# Patient Record
Sex: FEMALE | Race: BLACK OR AFRICAN AMERICAN | URBAN - METROPOLITAN AREA
[De-identification: names, ages, dates, MRNs, and addresses within clinical notes are randomized per-mention and may not be internally consistent; named-entity substitution may affect disease eponyms.]

---

## 2024-08-30 ENCOUNTER — EMERGENCY (EMERGENCY)
Facility: HOSPITAL | Age: 44
LOS: 1 days | End: 2024-08-30

## 2024-08-30 VITALS
OXYGEN SATURATION: 98 % | WEIGHT: 279.99 LBS | TEMPERATURE: 98 F | DIASTOLIC BLOOD PRESSURE: 84 MMHG | SYSTOLIC BLOOD PRESSURE: 122 MMHG | HEART RATE: 60 BPM | HEIGHT: 63 IN | RESPIRATION RATE: 16 BRPM

## 2024-08-30 PROCEDURE — L9981: CPT

## 2024-08-30 NOTE — ED ADULT TRIAGE NOTE - PAIN: PRESENCE, MLM
TRANSESOPHAGEAL ECHOCARDIOGRAM     After risks and benefits of procedures were explained, informed consent was obtained and placed in chart.   The patient received topical anesthestic to the oropharynx with viscous lidocaine and benzocaine spray.  Refer to Anesthesia note for sedation details.  The ANITA probe was passed into the esophagus without difficulty.  Transesophageal and transgastric images were obtained.  The ANITA probe was removed without difficulty and examined.  There was no evidence for bleeding.  The patient tolerated the procedure well without any immediate ANITA-related complications.      Preliminary Findings:  ZARIA: Left atrial appendage was clear of clot and smoke.  LV: LVEF was estimated at 45-50%  RV: mild RV enlargement.   RA: mild enlargement: no clot seen in RAA  MV: mild MR, No evidence for MS.   AV: No evidence for AI, no evidence for AS. MIld thickening of the aortic valve leaflet tips  TV: mild TR.   IAS: no PFO. NO R-> L shunt.   There was moderate, non-mobile atheroma seen in the thoracic aorta.     Unclear etiology of congenital heart disease but ASD is a possibility.     Final report to follow. complains of pain/discomfort